# Patient Record
Sex: FEMALE | Race: WHITE | Employment: UNEMPLOYED | URBAN - METROPOLITAN AREA
[De-identification: names, ages, dates, MRNs, and addresses within clinical notes are randomized per-mention and may not be internally consistent; named-entity substitution may affect disease eponyms.]

---
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
## 2021-07-14 ENCOUNTER — OFFICE VISIT (OUTPATIENT)
Dept: PRIMARY CARE CLINIC | Age: 21
End: 2021-07-14

## 2021-07-14 VITALS
HEIGHT: 65 IN | SYSTOLIC BLOOD PRESSURE: 110 MMHG | RESPIRATION RATE: 16 BRPM | WEIGHT: 144 LBS | OXYGEN SATURATION: 96 % | TEMPERATURE: 99.3 F | DIASTOLIC BLOOD PRESSURE: 78 MMHG | HEART RATE: 106 BPM | BODY MASS INDEX: 23.99 KG/M2

## 2021-07-14 DIAGNOSIS — J02.9 VIRAL PHARYNGITIS: Primary | ICD-10-CM

## 2021-07-14 DIAGNOSIS — J02.9 SORE THROAT: ICD-10-CM

## 2021-07-14 PROBLEM — Z20.822 CONTACT WITH AND (SUSPECTED) EXPOSURE TO COVID-19: Status: ACTIVE | Noted: 2021-07-14
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
## 2021-07-14 PROBLEM — S02.122A: Status: ACTIVE | Noted: 2021-07-14

## 2021-07-14 PROBLEM — Z29.13 NEED FOR RHOGAM DUE TO RH NEGATIVE MOTHER: Status: ACTIVE | Noted: 2021-07-14

## 2021-07-14 PROBLEM — S06.9XAA INTRACRANIAL INJURY: Status: ACTIVE | Noted: 2021-07-14

## 2021-07-14 PROBLEM — S02.0XXA: Status: ACTIVE | Noted: 2021-07-14

## 2021-07-14 LAB
S PYO AG THROAT QL: NEGATIVE
VALID INTERNAL CONTROL?: YES

## 2021-07-14 PROCEDURE — 87880 STREP A ASSAY W/OPTIC: CPT | Performed by: NURSE PRACTITIONER

## 2021-07-14 PROCEDURE — 99213 OFFICE O/P EST LOW 20 MIN: CPT | Performed by: NURSE PRACTITIONER

## 2021-07-14 NOTE — PATIENT INSTRUCTIONS
Sore Throat: Care Instructions  Your Care Instructions     Infection by bacteria or a virus causes most sore throats. Cigarette smoke, dry air, air pollution, allergies, and yelling can also cause a sore throat. Sore throats can be painful and annoying. Fortunately, most sore throats go away on their own. If you have a bacterial infection, your doctor may prescribe antibiotics. Follow-up care is a key part of your treatment and safety. Be sure to make and go to all appointments, and call your doctor if you are having problems. It's also a good idea to know your test results and keep a list of the medicines you take. How can you care for yourself at home? · If your doctor prescribed antibiotics, take them as directed. Do not stop taking them just because you feel better. You need to take the full course of antibiotics. · Gargle with warm salt water once an hour to help reduce swelling and relieve discomfort. Use 1 teaspoon of salt mixed in 1 cup of warm water. · Take an over-the-counter pain medicine, such as acetaminophen (Tylenol), ibuprofen (Advil, Motrin), or naproxen (Aleve). Read and follow all instructions on the label. · Be careful when taking over-the-counter cold or flu medicines and Tylenol at the same time. Many of these medicines have acetaminophen, which is Tylenol. Read the labels to make sure that you are not taking more than the recommended dose. Too much acetaminophen (Tylenol) can be harmful. · Drink plenty of fluids. Fluids may help soothe an irritated throat. Hot fluids, such as tea or soup, may help decrease throat pain. · Use over-the-counter throat lozenges to soothe pain. Regular cough drops or hard candy may also help. These should not be given to young children because of the risk of choking. · Do not smoke or allow others to smoke around you. If you need help quitting, talk to your doctor about stop-smoking programs and medicines.  These can increase your chances of quitting for good. · Use a vaporizer or humidifier to add moisture to your bedroom. Follow the directions for cleaning the machine. When should you call for help? Call your doctor now or seek immediate medical care if:    · You have new or worse trouble swallowing.     · Your sore throat gets much worse on one side. Watch closely for changes in your health, and be sure to contact your doctor if you do not get better as expected. Where can you learn more? Go to http://www.gray.com/  Enter U420 in the search box to learn more about \"Sore Throat: Care Instructions. \"  Current as of: December 2, 2020               Content Version: 12.8  © 4238-9725 Healthwise, Incorporated. Care instructions adapted under license by [facility] (which disclaims liability or warranty for this information). If you have questions about a medical condition or this instruction, always ask your healthcare professional. [address] any warranty or liability for your use of this information.

## 2021-07-14 NOTE — PROGRESS NOTES
HISTORY OF PRESENT ILLNESS  Lizette Gastelum is a 24 y.o. female. Patient with complaints of sore throat x 1 day. Woke with sore throat. Did not improve throughout day. No fever\ congestion/ ear pain/ trouble swallowing. Last antibiotic use over a year ago. Has not taken anything to treat. Has had no ill exposures. Has not had COVID vaccine     Sore Throat   The history is provided by the patient. This is a new problem. The current episode started yesterday. The problem has not changed since onset. There has been no fever. Pertinent negatives include no diarrhea, no vomiting, no congestion, no ear discharge, no ear pain, no headaches, no shortness of breath, no swollen glands, no trouble swallowing, no stiff neck and no cough. She has had no exposure to strep. She has tried nothing for the symptoms. 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 Negative for congestion, ear discharge, ear pain and trouble swallowing. Eyes: Negative for redness. Respiratory: Negative for cough and shortness of breath. Cardiovascular: Negative for palpitations. Gastrointestinal: Negative for diarrhea and vomiting. Musculoskeletal: Negative for neck pain. Neurological: Negative for sensory change and headaches. All other systems reviewed and are negative. Physical Exam  Constitutional:       General: She is not in acute distress. Appearance: She is normal weight. HENT:      Head: Normocephalic and atraumatic.       Right Ear: Tympanic membrane and ear canal normal.      Left Ear: Tympanic membrane and ear canal normal. Nose: Nose normal. No congestion or rhinorrhea. Mouth/Throat:      Tongue: No lesions. Pharynx: Posterior oropharyngeal erythema present. Tonsils: No tonsillar exudate. Eyes:      Pupils: Pupils are equal, round, and reactive to light. Cardiovascular:      Rate and Rhythm: Normal rate and regular rhythm. Pulses: Normal pulses. Heart sounds: Normal heart sounds. Pulmonary:      Effort: Pulmonary effort is normal.      Breath sounds: Normal breath sounds. Musculoskeletal:      Cervical back: No rigidity. Lymphadenopathy:      Cervical: No cervical adenopathy. Skin:     General: Skin is warm. Neurological:      General: No focal deficit present. Mental Status: She is alert. Results for orders placed or performed in visit on 07/14/21   AMB POC RAPID STREP A   Result Value Ref Range    VALID INTERNAL CONTROL POC Yes     Group A Strep Ag Negative Negative     Reviewed with patient  ASSESSMENT and PLAN    ICD-10-CM ICD-9-CM    1. Viral pharyngitis  J02.9 462    2. Sore throat  J02.9 462 AMB POC RAPID STREP A     Encounter Diagnoses   Name Primary?  Viral pharyngitis Yes    Sore throat      Orders Placed This Encounter    AMB POC RAPID STREP A   To treat a sore throat you should take mild pain medicine like acetaminophen or ibuprofen. Increase your fluids, eat a soft diet and do not smoke. Gargling with warm water or salt water (1 tsp. Salt in 8 oz. Water) can be helpful. Throat sprays and lozenges or sucking on hard candy will also ease the symptoms. Call your doctor if your sore throat lasts longer than one week. Call your doctor or the emergency room right away if you have the following symptoms: It was a pleasure to see you in the office today. Please call if you have any further questions or concerns. I am available through the portal system.      Signed By: ANGEL Caballero     July 14, 2021

## 2021-07-14 NOTE — PROGRESS NOTES
Chief Complaint   Patient presents with    Sore Throat     Patient in room #4 with complaints of sore throat since yesterday

## 2022-03-18 PROBLEM — S02.0XXA: Status: ACTIVE | Noted: 2021-07-14
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
## 2022-03-19 PROBLEM — S02.122A: Status: ACTIVE | Noted: 2021-07-14

## 2022-03-19 PROBLEM — S06.9XAA INTRACRANIAL INJURY (HCC): Status: ACTIVE | Noted: 2021-07-14

## 2022-03-20 PROBLEM — Z29.13 NEED FOR RHOGAM DUE TO RH NEGATIVE MOTHER: Status: ACTIVE | Noted: 2021-07-14

## 2022-03-20 PROBLEM — Z20.822 CONTACT WITH AND (SUSPECTED) EXPOSURE TO COVID-19: Status: ACTIVE | Noted: 2021-07-14

## 2022-07-06 ENCOUNTER — OFFICE VISIT (OUTPATIENT)
Dept: PRIMARY CARE CLINIC | Age: 22
End: 2022-07-06

## 2022-07-06 VITALS
WEIGHT: 147 LBS | BODY MASS INDEX: 23.07 KG/M2 | SYSTOLIC BLOOD PRESSURE: 130 MMHG | DIASTOLIC BLOOD PRESSURE: 91 MMHG | HEART RATE: 87 BPM | OXYGEN SATURATION: 96 % | RESPIRATION RATE: 16 BRPM | TEMPERATURE: 98.1 F | HEIGHT: 67 IN

## 2022-07-06 DIAGNOSIS — M25.531 RIGHT WRIST PAIN: Primary | ICD-10-CM

## 2022-07-06 DIAGNOSIS — L08.9 TOE INFECTION: ICD-10-CM

## 2022-07-06 PROCEDURE — 99212 OFFICE O/P EST SF 10 MIN: CPT | Performed by: INTERNAL MEDICINE

## 2022-07-06 RX ORDER — DAPSONE 50 MG/G
GEL TOPICAL
COMMUNITY
Start: 2022-03-07

## 2022-07-06 RX ORDER — AMOXICILLIN AND CLAVULANATE POTASSIUM 500; 125 MG/1; MG/1
1 TABLET, FILM COATED ORAL 2 TIMES DAILY
Qty: 14 TABLET | Refills: 0 | Status: SHIPPED | OUTPATIENT
Start: 2022-07-06

## 2022-07-06 RX ORDER — ADAPALENE 3 MG/G
GEL TOPICAL AT BEDTIME
COMMUNITY

## 2022-07-06 NOTE — PROGRESS NOTES
Chief Complaint   Patient presents with    Toe Pain     Patient c/o a burning , stinging  pain in her toe X 1-2 months and is more painful with shoes and socks on.  Wrist Pain     Patient states she heard a pop in her wrist while working out at the gym in 12/2021. She c/o sharp pain with certain exercises.      Visit Vitals  BP (!) 130/91   Pulse 87   Temp 98.1 °F (36.7 °C) (Temporal)   Resp 16   Ht 5' 7\" (1.702 m)   Wt 147 lb (66.7 kg)   SpO2 96%   BMI 23.02 kg/m²

## 2022-07-06 NOTE — PROGRESS NOTES
HISTORY OF PRESENT ILLNESS  Yehuda Kumari is a 22 y.o. female. Patient was seen after the last month she had tender, and redness to her left middle toe. Is often on her feet all day and feet stay enclosed in a show. Is dry to the top. No discharge. The pain does radiate down the toe at times. Also reports while exercising a year ago her wrist pooped. No when she does push ups or upper body workouts it can hurt after some time. No pain at this time. No numbness, tingling or swelling. Never too anything to help. Visit Vitals  BP (!) 130/91   Pulse 87   Temp 98.1 °F (36.7 °C) (Temporal)   Resp 16   Ht 5' 7\" (1.702 m)   Wt 147 lb (66.7 kg)   LMP 06/06/2022 (Approximate)   SpO2 96%   BMI 23.02 kg/m²     Past Medical History:   Diagnosis Date    Abscess     8/15/19-09/03/19 left posterior calf    Mild TBI (HCC)      Past Surgical History:   Procedure Laterality Date    HX ACL RECONSTRUCTION Left     HX OTHER SURGICAL  09/05/2019    I&D LEFT LEG ABSCESS    HX OTHER SURGICAL  10/11/2019    Incision and drainage of complex abscess of the axilla     Family History   Problem Relation Age of Onset    No Known Problems Mother     No Known Problems Father     Alzheimer's Disease Maternal Grandmother     Alzheimer's Disease Maternal Grandfather     Alzheimer's Disease Paternal Grandmother      Outpatient Encounter Medications as of 7/6/2022   Medication Sig Dispense Refill    Dapsone 5 % gel       amoxicillin-clavulanate (AUGMENTIN) 500-125 mg per tablet Take 1 Tablet by mouth two (2) times a day. 14 Tablet 0    adapalene (DIFFERIN) 0.3 % topical gel Apply  to affected area At bedtime.  clindamycin (CLEOCIN T) 1 % external solution  (Patient not taking: Reported on 7/6/2022)       No facility-administered encounter medications on file as of 7/6/2022. HPI    Review of Systems   Constitutional: Negative. Respiratory: Negative. Cardiovascular: Negative.     Musculoskeletal: Positive for joint pain.   Neurological: Negative for tingling. Physical Exam  Vitals and nursing note reviewed. Cardiovascular:      Rate and Rhythm: Normal rate and regular rhythm. Pulmonary:      Effort: Pulmonary effort is normal.      Breath sounds: Normal breath sounds. Musculoskeletal:      Right wrist: No swelling or tenderness. Normal range of motion. Normal pulse. Feet:    Skin:     General: Skin is warm. Neurological:      Mental Status: She is alert and oriented to person, place, and time. Psychiatric:         Behavior: Behavior normal.         ASSESSMENT and PLAN  Diagnoses and all orders for this visit:    1. Right wrist pain        -     Education given   2. Toe infection  -     amoxicillin-clavulanate (AUGMENTIN) 500-125 mg per tablet; Take 1 Tablet by mouth two (2) times a day. -     encouraged that the toe should breath and be open to air. Wear breathable shoes     Follow-up and Dispositions    · Return if symptoms worsen or fail to improve.        reviewed diet, exercise and weight control  reviewed medications and side effects in detail

## 2022-07-06 NOTE — PATIENT INSTRUCTIONS
Wrist Sprain: Care Instructions  Your Care Instructions     Your wrist hurts because you have stretched or torn ligaments, which connect the bones in your wrist.  Wrist sprains usually take from 2 to 10 weeks to heal, but some take longer. Usually, the more pain you have, the more severe your wrist sprain is and the longer it will take to heal. You can heal faster and regain strength in your wrist with good home treatment. Follow-up care is a key part of your treatment and safety. Be sure to make and go to all appointments, and call your doctor if you are having problems. It's also a good idea to know your test results and keep a list of the medicines you take. How can you care for yourself at home? · Prop up your arm on a pillow when you ice it or anytime you sit or lie down for the next 3 days. Try to keep your wrist above the level of your heart. This will help reduce swelling. · Put ice or cold packs on your wrist for 10 to 20 minutes at a time. Try to do this every 1 to 2 hours for the next 3 days (when you are awake) or until the swelling goes down. Put a thin cloth between the ice pack and your skin. · After 2 or 3 days, if your swelling is gone, apply a heating pad set on low or a warm cloth to your wrist. This helps keep your wrist flexible. Some doctors suggest that you go back and forth between hot and cold. · If you have an elastic bandage, keep it on for the next 24 to 36 hours. The bandage should be snug but not so tight that it causes numbness or tingling. To rewrap the wrist, wrap the bandage around the hand a few times, beginning at the fingers. Then wrap it around the hand between the thumb and index finger, ending by circling the wrist several times. · If your doctor gave you a splint or brace, wear it as directed to protect your wrist until it has healed. · Take pain medicines exactly as directed.   ? If the doctor gave you a prescription medicine for pain, take it as prescribed. ? If you are not taking a prescription pain medicine, ask your doctor if you can take an over-the-counter medicine. · Try not to use your injured wrist and hand. When should you call for help? Call your doctor now or seek immediate medical care if:    · Your hand or fingers are cool or pale or change color. Watch closely for changes in your health, and be sure to contact your doctor if:    · Your pain gets worse.     · Your wrist has not improved after 1 week. Where can you learn more? Go to http://www.gray.com/  Enter G541 in the search box to learn more about \"Wrist Sprain: Care Instructions. \"  Current as of: July 1, 2021               Content Version: 13.2  © 2006-2022 Healthwise, Incorporated. Care instructions adapted under license by [employer] (which disclaims liability or warranty for this information). If you have questions about a medical condition or this instruction, always ask your healthcare professional. [place] [address] any warranty or liability for your use of this information.

## 2023-05-24 RX ORDER — CLINDAMYCIN PHOSPHATE 11.9 MG/ML
SOLUTION TOPICAL
COMMUNITY
Start: 2020-01-09

## 2023-05-24 RX ORDER — ADAPALENE GEL USP, 0.3% 3 MG/G
GEL TOPICAL NIGHTLY
COMMUNITY

## 2023-05-24 RX ORDER — DAPSONE 50 MG/G
GEL TOPICAL
COMMUNITY
Start: 2022-03-07

## 2023-05-24 RX ORDER — AMOXICILLIN AND CLAVULANATE POTASSIUM 500; 125 MG/1; MG/1
1 TABLET, FILM COATED ORAL 2 TIMES DAILY
COMMUNITY
Start: 2022-07-06

## 2023-11-21 ENCOUNTER — OFFICE VISIT (OUTPATIENT)
Age: 23
End: 2023-11-21

## 2023-11-21 VITALS
HEART RATE: 83 BPM | SYSTOLIC BLOOD PRESSURE: 119 MMHG | RESPIRATION RATE: 16 BRPM | DIASTOLIC BLOOD PRESSURE: 88 MMHG | WEIGHT: 146 LBS | HEIGHT: 67 IN | TEMPERATURE: 96.9 F | OXYGEN SATURATION: 99 % | BODY MASS INDEX: 22.91 KG/M2

## 2023-11-21 DIAGNOSIS — R39.9 UTI SYMPTOMS: ICD-10-CM

## 2023-11-21 DIAGNOSIS — R31.0 GROSS HEMATURIA: Primary | ICD-10-CM

## 2023-11-21 DIAGNOSIS — R80.9 PROTEINURIA, UNSPECIFIED TYPE: ICD-10-CM

## 2023-11-21 LAB
BILIRUBIN, URINE, POC: NEGATIVE
BLOOD URINE, POC: ABNORMAL
GLUCOSE URINE, POC: NEGATIVE
KETONES, URINE, POC: NEGATIVE
LEUKOCYTE ESTERASE, URINE, POC: NEGATIVE
NITRITE, URINE, POC: NEGATIVE
PH, URINE, POC: 6 (ref 4.6–8)
PROTEIN,URINE, POC: ABNORMAL
SPECIFIC GRAVITY, URINE, POC: 1.02 (ref 1–1.03)
URINALYSIS CLARITY, POC: ABNORMAL
URINALYSIS COLOR, POC: ABNORMAL
UROBILINOGEN, POC: ABNORMAL

## 2023-11-21 RX ORDER — KETOROLAC TROMETHAMINE 10 MG/1
10 TABLET, FILM COATED ORAL EVERY 6 HOURS PRN
Qty: 15 TABLET | Refills: 0 | Status: SHIPPED | OUTPATIENT
Start: 2023-11-21 | End: 2023-11-26

## 2023-11-21 NOTE — PATIENT INSTRUCTIONS
- Take pain medication with with food as prescribed. - Obtain a urine strainer to monitor for passing of stones.     - Call and schedule CT of abdomine  [phone]

## 2023-11-22 LAB
BACTERIA SPEC CULT: NORMAL
SERVICE CMNT-IMP: NORMAL

## 2023-11-24 ENCOUNTER — TELEPHONE (OUTPATIENT)
Age: 23
End: 2023-11-24

## 2023-11-24 NOTE — TELEPHONE ENCOUNTER
Spoke with patient, reviewed negative urine culture. She has CT scan scheduled for 11/28. She notes she will have to return to college next week and can not follow up in person.     [address], [place] - NP

## 2023-11-24 NOTE — TELEPHONE ENCOUNTER
Attempted to call and discuss results of urine culture although patient is unable to receive at this time, voicemail box is full, unable to leave voicemail. Portal message sent to patient with results.

## 2023-11-28 ENCOUNTER — TELEPHONE (OUTPATIENT)
Age: 23
End: 2023-11-28

## 2023-11-28 ENCOUNTER — HOSPITAL ENCOUNTER (OUTPATIENT)
Facility: HOSPITAL | Age: 23
Discharge: HOME | End: 2023-12-01
Payer: COMMERCIAL

## 2023-11-28 DIAGNOSIS — R31.0 GROSS HEMATURIA: Primary | ICD-10-CM

## 2023-11-28 DIAGNOSIS — R31.0 GROSS HEMATURIA: ICD-10-CM

## 2023-11-28 PROCEDURE — 74178 CT ABD&PLV WO CNTR FLWD CNTR: CPT

## 2023-11-28 PROCEDURE — 6360000004 HC RX CONTRAST MEDICATION: Performed by: INTERNAL MEDICINE

## 2023-11-28 RX ADMIN — IOPAMIDOL 100 ML: 755 INJECTION, SOLUTION INTRAVENOUS at 09:12

## 2023-11-28 NOTE — TELEPHONE ENCOUNTER
Pt mom said they was referred out and where they were referred need the office notes from that appointment

## 2023-11-29 ENCOUNTER — TELEPHONE (OUTPATIENT)
Age: 23
End: 2023-11-29

## 2023-11-29 DIAGNOSIS — N20.0 KIDNEY STONE: Primary | ICD-10-CM

## 2023-11-29 RX ORDER — TAMSULOSIN HYDROCHLORIDE 0.4 MG/1
0.4 CAPSULE ORAL DAILY
Qty: 30 CAPSULE | Refills: 0 | Status: SHIPPED | OUTPATIENT
Start: 2023-11-29

## 2023-11-30 NOTE — PROGRESS NOTES
Called and spoke to patient regarding results of CT scan. Advised to follow up with urology (information provided) and start tamsulosin daily as patient is having increase pain to left flank. If able to obtain stone for studies take to urology appointment. Answered questions patient had and advised to follow up as needed in clinic.

## 2023-11-30 NOTE — TELEPHONE ENCOUNTER
----- Message from Zeinab Boyer MD sent at 11/29/2023 12:42 PM EST -----  Regarding: SUSANNE:  JAMIL  ----- Message -----  From: Aminta Muniz Incoming Orders Results To [facility]  Sent: 11/28/2023   6:57 PM EST  To: Zeinab Boyer MD